# Patient Record
(demographics unavailable — no encounter records)

---

## 2024-10-17 NOTE — PHYSICAL EXAM
[Normal] : moves all extremities, no focal deficits, normal speech [de-identified] :  No carotid bruits auscultated bilaterally.

## 2024-10-17 NOTE — CARDIOLOGY SUMMARY
[de-identified] : 8/26/2024, NSR, normal ECG [de-identified] : 10/4/2024, LV EF 60-65%, trace MR, trace TR with estimated PASP of 12mmHg. [de-identified] : 9/25/2024, CTA Coronary Arteries: normal coronary arteries.

## 2024-10-17 NOTE — HISTORY OF PRESENT ILLNESS
[FreeTextEntry1] : 64 year old male with PMHx of low testosterone on TRT, RADHA on CPAP presents for a cardiac evaluation. Patient has been having left sided chest pain/pulling and dyspnea. He works at [employer] and lifts heavy objects throughout the day and has a very physical job. He gets winded with working.  There is no history of MI, CVA, CHF, or previous coronary intervention.

## 2024-10-17 NOTE — DISCUSSION/SUMMARY
[FreeTextEntry1] : 1. Chest Pain/Dyspnea: non-cardiac. Normal coronary arteries on CCTA from 9/25/2024. Normal echocardiogram from 10/4/2024.  Follow up on PRN basis.

## 2024-12-23 NOTE — PROCEDURE
[FreeTextEntry1] : Compliance report 9/24/2024 to 12/20/2020 498% of days used 98% of days greater than 4 hours AHI 0.6 this is excellent compliance

## 2024-12-23 NOTE — HISTORY OF PRESENT ILLNESS
[Never] : never [APAP:] : APAP [Full Face mask] : full face mask [TextBox_4] : 64 male hx mod  obstructive sleep apnea dx Jun 2023 using apap  feels good using apap nitely  no snoring  feel refreshed in am  more alert in am   sleep schedule 10 pm - 5 am occ   [TextBox_158] : [facility]

## 2024-12-23 NOTE — DISCUSSION/SUMMARY
[FreeTextEntry1] : Mr Nielsen has moderate obstructive sleep apnea.  He is using the APAP properly and his AHI is less than 1.  All of his symptoms have resolved.  All questions were answered.  He has slight increased mucus in the mask.  This is possibly from too much humidity initially he will turn it down some and see if this helps.  Patient will follow-up in 6 months. The patient understands and agrees with plan of care. Today's office visit encompassed 32 minutes which excludes teaching and separately reported services.. I conducted an extensive history, physical exam and reviewed diagnosis and treatment options including diagnostic tests,radiology studies including cat scans and the use of prescription medication.

## 2025-07-01 NOTE — PROCEDURE
[FreeTextEntry1] : Compliance report 4/2/2025 to 6/30/2025 100% of days used 100% of days greater than 4 hours AHI 0.7 is excellent compliance.

## 2025-07-01 NOTE — REASON FOR VISIT
[Follow-Up] : a follow-up visit [Sleep Apnea] : sleep apnea [TextEntry] : Patient states he has been using his CPAP.  His quality of sleep is good.  He does wake up tired sometimes.

## 2025-07-01 NOTE — DISCUSSION/SUMMARY
[FreeTextEntry1] : Mr Nielsen has moderate obstructive sleep apnea.  He is using the equipment perfectly and is having excellent results.  He occasionally feels tired but does awaken early for work and works very hard.  He does agree that he has to lose weight exercise any properly.  This certainly improve his overall health.  Will continue all therapy at this time. The patient understands and agrees with plan of care. Today's office visit encompassed 32 minutes which excludes teaching and separately reported services.. I conducted an extensive history, physical exam and reviewed diagnosis and treatment options including diagnostic tests,radiology studies including cat scans and the use of prescription medication.

## 2025-07-01 NOTE — HISTORY OF PRESENT ILLNESS
[Never] : never [Obstructive Sleep Apnea] : obstructive sleep apnea [APAP:] : APAP [Full Face mask] : full face mask [TextBox_4] : 65 male hx obstructive sleep apnea  today feels good using machine nitely and feels better occ awakens tired  if sleeps sl less no snore use nitely  sleep schedule   930 pm - 500 am  [TextBox_158] : [facility]